# Patient Record
Sex: MALE
[De-identification: names, ages, dates, MRNs, and addresses within clinical notes are randomized per-mention and may not be internally consistent; named-entity substitution may affect disease eponyms.]

---

## 2022-12-02 ENCOUNTER — DASHBOARD ENCOUNTERS (OUTPATIENT)
Age: 37
End: 2022-12-02

## 2022-12-02 ENCOUNTER — LAB OUTSIDE AN ENCOUNTER (OUTPATIENT)
Dept: URBAN - METROPOLITAN AREA CLINIC 82 | Facility: CLINIC | Age: 37
End: 2022-12-02

## 2022-12-02 ENCOUNTER — OFFICE VISIT (OUTPATIENT)
Dept: URBAN - METROPOLITAN AREA CLINIC 82 | Facility: CLINIC | Age: 37
End: 2022-12-02
Payer: COMMERCIAL

## 2022-12-02 VITALS
HEART RATE: 85 BPM | BODY MASS INDEX: 29.88 KG/M2 | DIASTOLIC BLOOD PRESSURE: 78 MMHG | SYSTOLIC BLOOD PRESSURE: 142 MMHG | HEIGHT: 71 IN | TEMPERATURE: 98.2 F | WEIGHT: 213.4 LBS

## 2022-12-02 DIAGNOSIS — R10.13 EPIGASTRIC ABDOMINAL PAIN: ICD-10-CM

## 2022-12-02 DIAGNOSIS — K62.5 RECTAL BLEEDING: ICD-10-CM

## 2022-12-02 DIAGNOSIS — K59.01 SLOW TRANSIT CONSTIPATION: ICD-10-CM

## 2022-12-02 DIAGNOSIS — R10.84 ABDOMINAL PAIN, GENERALIZED: ICD-10-CM

## 2022-12-02 PROBLEM — 721730009: Status: ACTIVE | Noted: 2022-12-02

## 2022-12-02 PROCEDURE — 99244 OFF/OP CNSLTJ NEW/EST MOD 40: CPT | Performed by: INTERNAL MEDICINE

## 2022-12-02 PROCEDURE — 99204 OFFICE O/P NEW MOD 45 MIN: CPT | Performed by: INTERNAL MEDICINE

## 2022-12-02 RX ORDER — BISACODYL 5 MG
2 TABLET, DELAYED RELEASE (ENTERIC COATED) ORAL ONCE A DAY
Qty: 4 | OUTPATIENT
Start: 2022-12-02 | End: 2022-12-04

## 2022-12-02 RX ORDER — SODIUM, POTASSIUM,MAG SULFATES 17.5-3.13G
354 ML SOLUTION, RECONSTITUTED, ORAL ORAL
Qty: 1 BOX | Refills: 1 | OUTPATIENT
Start: 2022-12-02 | End: 2022-12-04

## 2022-12-02 RX ORDER — OMEPRAZOLE 20 MG/1
1 CAPSULE 30 MINUTES BEFORE MORNING MEAL CAPSULE, DELAYED RELEASE ORAL ONCE A DAY
Status: ACTIVE | COMMUNITY

## 2022-12-02 NOTE — HPI-TODAY'S VISIT:
ALWAYS EPIGASTRIC PAIN FOR YEARS, SEEN PCP, JUNE/JULY, CRAMPS IN LOWER ABDOMEN, LOWER BACK HURT, EVEN TO THE POINT EVEN IF LAY DOWN IT WORST, IF ON TREAD MILL IT GETS BETTER. PAIN LAST FOR 4 MONTHS  EATING AND RUN TO BM.   CHECKED WITH STD, PAIN IN PERIANAL AREA  CHECKED H PYLORI, S/P TREATMENT AMOXICILLIN, PPI, CANT  REMEMBER THIRD ONE  FOR 10 DAYS. , FIRST SIX DAY IT HELPED, % LAST ONE MONTH FEEL OKAY  LONGEST TIME SINCE AGE 14, COULD DRINK 4-5 CAN OF COKE  A DAY, IN OCT NOTICED ONE CAN COKE, ONE SHOT WHISKY, NOTICED BLOOD IN STOOL, BRIGHT RED, GETS BETTER IN 2 DAYS. ?? HEORRHOIDS,   LOT OF GAS, NOW INTERMITTEN T FASTIN, AND STILL GET CONSTIPATED . EAT VEGGIES , STILL WHOLE LIFE AD CONSTIPATION. GOES TO BM 4-5 TIMES A DAY. AND NOT FEEL ADEQUATE EVACUATION.  NO WT LOSS  NO HEART OR LUNG ISSUE  NO FHCC/FHCP  NO IBD.   NO ANAL SEX, NO STD.

## 2022-12-05 PROBLEM — 54586004: Status: ACTIVE | Noted: 2022-12-02

## 2022-12-06 PROBLEM — 35298007: Status: ACTIVE | Noted: 2022-12-02

## 2022-12-06 PROBLEM — 12063002: Status: ACTIVE | Noted: 2022-12-02

## 2022-12-06 PROBLEM — 79922009: Status: ACTIVE | Noted: 2022-12-02

## 2022-12-22 ENCOUNTER — WEB ENCOUNTER (OUTPATIENT)
Dept: URBAN - METROPOLITAN AREA SURGERY CENTER 13 | Facility: SURGERY CENTER | Age: 37
End: 2022-12-22

## 2022-12-23 ENCOUNTER — CLAIMS CREATED FROM THE CLAIM WINDOW (OUTPATIENT)
Dept: URBAN - METROPOLITAN AREA SURGERY CENTER 13 | Facility: SURGERY CENTER | Age: 37
End: 2022-12-23

## 2022-12-23 ENCOUNTER — CLAIMS CREATED FROM THE CLAIM WINDOW (OUTPATIENT)
Dept: URBAN - METROPOLITAN AREA SURGERY CENTER 13 | Facility: SURGERY CENTER | Age: 37
End: 2022-12-23
Payer: COMMERCIAL

## 2022-12-23 DIAGNOSIS — K59.09 CHANGE IN BOWEL MOVEMENTS INTERMITTENT CONSTIPATION. URGENCY IN THE MORNING.: ICD-10-CM

## 2022-12-23 DIAGNOSIS — R10.13 ABDOMINAL DISCOMFORT, EPIGASTRIC: ICD-10-CM

## 2022-12-23 DIAGNOSIS — K29.60 ADENOPAPILLOMATOSIS GASTRICA: ICD-10-CM

## 2022-12-23 PROCEDURE — G8907 PT DOC NO EVENTS ON DISCHARG: HCPCS | Performed by: INTERNAL MEDICINE

## 2022-12-23 PROCEDURE — 43239 EGD BIOPSY SINGLE/MULTIPLE: CPT | Performed by: INTERNAL MEDICINE

## 2022-12-23 PROCEDURE — 45378 DIAGNOSTIC COLONOSCOPY: CPT | Performed by: INTERNAL MEDICINE

## 2022-12-23 RX ORDER — OMEPRAZOLE 20 MG/1
1 CAPSULE 30 MINUTES BEFORE MORNING MEAL CAPSULE, DELAYED RELEASE ORAL ONCE A DAY
Status: ACTIVE | COMMUNITY